# Patient Record
Sex: MALE | Race: OTHER | HISPANIC OR LATINO | Employment: OTHER | URBAN - METROPOLITAN AREA
[De-identification: names, ages, dates, MRNs, and addresses within clinical notes are randomized per-mention and may not be internally consistent; named-entity substitution may affect disease eponyms.]

---

## 2022-03-21 ENCOUNTER — HOSPITAL ENCOUNTER (EMERGENCY)
Facility: HOSPITAL | Age: 32
Discharge: HOME | End: 2022-03-22
Attending: STUDENT IN AN ORGANIZED HEALTH CARE EDUCATION/TRAINING PROGRAM
Payer: OTHER GOVERNMENT

## 2022-03-21 DIAGNOSIS — M79.605 LEFT LEG PAIN: ICD-10-CM

## 2022-03-21 DIAGNOSIS — M76.62 TENDONITIS, ACHILLES, LEFT: ICD-10-CM

## 2022-03-21 LAB
ALBUMIN SERPL BCP-MCNC: 4.5 G/DL (ref 3.5–5.2)
ALP SERPL-CCNC: 80 U/L (ref 55–135)
ALT SERPL W/O P-5'-P-CCNC: 95 U/L (ref 10–44)
ANION GAP SERPL CALC-SCNC: 8 MMOL/L (ref 8–16)
AST SERPL-CCNC: 37 U/L (ref 10–40)
BASOPHILS # BLD AUTO: 0.04 K/UL (ref 0–0.2)
BASOPHILS NFR BLD: 0.4 % (ref 0–1.9)
BILIRUB SERPL-MCNC: 0.6 MG/DL (ref 0.1–1)
BUN SERPL-MCNC: 14 MG/DL (ref 6–20)
CALCIUM SERPL-MCNC: 9.7 MG/DL (ref 8.7–10.5)
CHLORIDE SERPL-SCNC: 106 MMOL/L (ref 95–110)
CK SERPL-CCNC: 81 U/L (ref 20–200)
CO2 SERPL-SCNC: 23 MMOL/L (ref 23–29)
CREAT SERPL-MCNC: 1.1 MG/DL (ref 0.5–1.4)
DIFFERENTIAL METHOD: NORMAL
EOSINOPHIL # BLD AUTO: 0.2 K/UL (ref 0–0.5)
EOSINOPHIL NFR BLD: 2.1 % (ref 0–8)
ERYTHROCYTE [DISTWIDTH] IN BLOOD BY AUTOMATED COUNT: 12.2 % (ref 11.5–14.5)
EST. GFR  (AFRICAN AMERICAN): >60 ML/MIN/1.73 M^2
EST. GFR  (NON AFRICAN AMERICAN): >60 ML/MIN/1.73 M^2
GLUCOSE SERPL-MCNC: 96 MG/DL (ref 70–110)
HCT VFR BLD AUTO: 49.2 % (ref 40–54)
HGB BLD-MCNC: 16.6 G/DL (ref 14–18)
IMM GRANULOCYTES # BLD AUTO: 0.02 K/UL (ref 0–0.04)
IMM GRANULOCYTES NFR BLD AUTO: 0.2 % (ref 0–0.5)
LYMPHOCYTES # BLD AUTO: 3.5 K/UL (ref 1–4.8)
LYMPHOCYTES NFR BLD: 34.3 % (ref 18–48)
MCH RBC QN AUTO: 28.7 PG (ref 27–31)
MCHC RBC AUTO-ENTMCNC: 33.7 G/DL (ref 32–36)
MCV RBC AUTO: 85 FL (ref 82–98)
MONOCYTES # BLD AUTO: 0.9 K/UL (ref 0.3–1)
MONOCYTES NFR BLD: 8.8 % (ref 4–15)
NEUTROPHILS # BLD AUTO: 5.6 K/UL (ref 1.8–7.7)
NEUTROPHILS NFR BLD: 54.2 % (ref 38–73)
NRBC BLD-RTO: 0 /100 WBC
PLATELET # BLD AUTO: 260 K/UL (ref 150–450)
PMV BLD AUTO: 9.9 FL (ref 9.2–12.9)
POTASSIUM SERPL-SCNC: 3.9 MMOL/L (ref 3.5–5.1)
PROT SERPL-MCNC: 8.3 G/DL (ref 6–8.4)
RBC # BLD AUTO: 5.78 M/UL (ref 4.6–6.2)
SODIUM SERPL-SCNC: 137 MMOL/L (ref 136–145)
WBC # BLD AUTO: 10.29 K/UL (ref 3.9–12.7)

## 2022-03-21 PROCEDURE — 99284 EMERGENCY DEPT VISIT MOD MDM: CPT | Mod: 25

## 2022-03-21 PROCEDURE — 82550 ASSAY OF CK (CPK): CPT | Performed by: EMERGENCY MEDICINE

## 2022-03-21 PROCEDURE — 80053 COMPREHEN METABOLIC PANEL: CPT | Performed by: EMERGENCY MEDICINE

## 2022-03-21 PROCEDURE — 85025 COMPLETE CBC W/AUTO DIFF WBC: CPT | Performed by: EMERGENCY MEDICINE

## 2022-03-21 RX ORDER — LISINOPRIL 10 MG/1
10 TABLET ORAL
COMMUNITY
Start: 2021-12-16 | End: 2023-03-15

## 2022-03-21 RX ORDER — SERTRALINE HYDROCHLORIDE 100 MG/1
TABLET, FILM COATED ORAL
COMMUNITY
Start: 2021-08-25

## 2022-03-22 VITALS
OXYGEN SATURATION: 100 % | BODY MASS INDEX: 36.37 KG/M2 | SYSTOLIC BLOOD PRESSURE: 132 MMHG | DIASTOLIC BLOOD PRESSURE: 80 MMHG | HEIGHT: 68 IN | HEART RATE: 89 BPM | TEMPERATURE: 99 F | WEIGHT: 240 LBS | RESPIRATION RATE: 18 BRPM

## 2022-03-22 RX ORDER — HYDROCODONE BITARTRATE AND ACETAMINOPHEN 5; 325 MG/1; MG/1
1 TABLET ORAL EVERY 6 HOURS PRN
Qty: 12 TABLET | Refills: 0 | Status: SHIPPED | OUTPATIENT
Start: 2022-03-22 | End: 2022-03-25

## 2022-03-22 NOTE — ED PROVIDER NOTES
"Encounter Date: 3/21/2022    SCRIBE #1 NOTE: I, Nila Morton, am scribing for, and in the presence of,  Ruma Whitehead DO. I have scribed the following portions of the note - Other sections scribed: HPI, ROS, PE.       History     Chief Complaint   Patient presents with    Leg Pain     Pt post-op of achilles surgery in January, pt started having pain that starts in the left foot, up to the calf, and back of the hamstring. Pt took 3 doses of his flexeril with no relief. Ambulatory, VSS, Aox4     Johann Ramires is a 31 y.o male with a PMHx of HTN presenting to the ED with a chief complaint of foot pain onset 2:00PM this afternoon. The patient complains of chronic bilateral lower leg and foot pain, left more severe than right, that became severe this afternoon leading the patient to call the nurse on call line where he was told to come to the ED. Patient states that the pain is sharp and shooting, and that there is intermittent "compression pain" that shoots up from his foot and into his calf. Additionally states that last week his left foot was "double the size of the right" but that the swelling has since gone down. Reports recent left foot surgery performed by Dr. Bradley in Podiatry for plantar fascia and achilles tears on 1/14/22. Patient states that following the surgery he was prescribed Flexeril and took 3 before arrival. Reports no alleviation in pain from the Flexeril but does state that in the past the Flexeril was able to alleviate the majority of his pain. Is additionally compliant with Lisinopril. Patient is a cigar smoker. Denies numbness, weakness, chest pain, shortness of breath, nausea and vomiting. No other associated symptoms.     The history is provided by the patient. No  was used.     Review of patient's allergies indicates:  No Known Allergies  Past Medical History:   Diagnosis Date    Diabetes mellitus     Hypertension      No past surgical history on file.  No " family history on file.     Review of Systems   Constitutional: Negative for chills and fever.   HENT: Negative for congestion and sore throat.    Eyes: Negative for visual disturbance.   Respiratory: Negative for cough and shortness of breath.    Cardiovascular: Positive for leg swelling (left, resolved). Negative for chest pain.   Gastrointestinal: Negative for abdominal pain, nausea and vomiting.   Genitourinary: Negative for dysuria.   Musculoskeletal: Positive for arthralgias (bilateral lower leg and foot pain).   Skin: Negative for rash.   Neurological: Negative for weakness, numbness and headaches.   Psychiatric/Behavioral: Negative for confusion.       Physical Exam     Initial Vitals [03/21/22 2027]   BP Pulse Resp Temp SpO2   136/88 101 18 98.9 °F (37.2 °C) 96 %      MAP       --         Physical Exam    Nursing note and vitals reviewed.  Constitutional: He appears well-developed and well-nourished. He is not diaphoretic.  Non-toxic appearance. He does not have a sickly appearance. He does not appear ill.   HENT:   Head: Normocephalic and atraumatic.   Nose: Nose normal.   Mouth/Throat: No oropharyngeal exudate.   Eyes: EOM are normal. Pupils are equal, round, and reactive to light.   Neck: No tracheal deviation present. No JVD present.   Cardiovascular: Normal rate, regular rhythm, normal heart sounds and intact distal pulses.   No murmur heard.  Pulmonary/Chest: Breath sounds normal. No respiratory distress. He has no wheezes. He has no rhonchi. He has no rales.   Abdominal: Abdomen is soft. He exhibits no distension. There is no abdominal tenderness. There is no rebound and no guarding.   Musculoskeletal:         General: No tenderness or edema. Normal range of motion.      Right lower leg: Normal. No swelling, deformity, lacerations, tenderness or bony tenderness. No edema.      Left lower leg: No swelling, deformity, lacerations or bony tenderness. No edema.      Comments: Normal dorsiflexion and  plantar flexion in the LLE     Neurological: He is alert and oriented to person, place, and time. He has normal strength. He displays no atrophy and no tremor. No cranial nerve deficit. He exhibits normal muscle tone. He displays no seizure activity.   Skin: Skin is warm and dry. Capillary refill takes less than 2 seconds. No rash noted. No erythema.   Well healed scar to the posterior aspect of the distal left leg.          ED Course   Procedures  Labs Reviewed   COMPREHENSIVE METABOLIC PANEL - Abnormal; Notable for the following components:       Result Value    ALT 95 (*)     All other components within normal limits   CBC W/ AUTO DIFFERENTIAL   CK          Imaging Results          US Lower Extremity Veins Left (Final result)  Result time 03/22/22 00:13:18   Procedure changed from US Lower Extremity Veins Left     Final result by Manjinder Cisneros MD (03/22/22 00:13:18)                 Impression:      No evidence of deep venous thrombosis in the left lower extremity.      Electronically signed by: Manjinder Cisneros  Date:    03/22/2022  Time:    00:13             Narrative:    EXAMINATION:  US LOWER EXTREMITY VEINS LEFT    CLINICAL HISTORY:  achilles pain, left; previous swelling that has since resolved; Achilles tendinitis, left leg    TECHNIQUE:  Duplex and color flow Doppler evaluation and graded compression of the left lower extremity veins was performed.    COMPARISON:  None    FINDINGS:  Left thigh veins: The common femoral, femoral, popliteal, upper greater saphenous, and deep femoral veins are patent and free of thrombus. The veins are normally compressible and have normal phasic flow and augmentation response.    Left calf veins: The visualized calf veins are patent.    Contralateral CFV: The contralateral (right) common femoral vein is patent and free of thrombus.    Miscellaneous: None                                 Medications - No data to display  Medical Decision Making:   History:   Old Medical  Records: I decided to obtain old medical records.  Clinical Tests:   Lab Tests: Ordered and Reviewed  Radiological Study: Ordered and Reviewed  ED Management:   [facility]  This is an emergent evaluation of a 31 y.o male with a PMHx of HTN presenting to the ED with a chief complaint of foot pain onset 2:00PM this afternoon.  Initial vitals in the emergency department unremarkable. Physical exam noted above. DDx includes but is not limited to musculoskeletal pain, chronic pain, DVT, rhabdomyolysis.  Also considered however clinically less likely be compartment syndrome, acute fracture dislocation.  Labs and imaging including CBC, CMP, CPK, ultrasound of the left lower extremity were obtained.  Labs reveal a elevated ALT with remainder of labs unremarkable.  Left lower extremity ultrasound negative for DVT.  Given history of presentation in the setting of a benign workup, will discharge with pain medication, podiatry follow-up in ED return precautions. Patient is aware of plan and is amenable.     Ruma Whitehead D.O  EMERGENCY MEDICINE  12:36 AM 03/22/2022          Scribe Attestation:   Scribe #1: I performed the above scribed service and the documentation accurately describes the services I performed. I attest to the accuracy of the note.                 Clinical Impression:   Final diagnoses:  [M79.605] Left leg pain        I, Ruma Whitehead, DO, personally performed the services described in this documentation. All medical record entries made by the scribe were at my direction and in my presence. I have reviewed the chart and agree that the record reflects my personal performance and is accurate and complete.       ED Disposition Condition    Discharge Stable        ED Prescriptions     Medication Sig Dispense Start Date End Date Auth. Provider    HYDROcodone-acetaminophen (NORCO) 5-325 mg per tablet Take 1 tablet by mouth every 6 (six) hours as needed for Pain. 12 tablet 3/22/2022 3/25/2022 Ruma BENDER  Ventura, DO        Follow-up Information     Follow up With Specialties Details Why Contact Info    Your podiatrist  Schedule an appointment as soon as possible for a visit in 2 days Emergency Room Follow-up [facility] Emergency Medicine Go to  If symptoms worsen [address] [place] [id number] [phone]           Ruma Whitehead,   03/22/22 8888

## 2022-08-05 ENCOUNTER — TELEPHONE (OUTPATIENT)
Dept: NEUROLOGY | Facility: CLINIC | Age: 32
End: 2022-08-05
Payer: OTHER GOVERNMENT

## 2022-08-05 NOTE — TELEPHONE ENCOUNTER
----- Message from Makayla Ellis sent at 8/5/2022 10:21 AM CDT -----  Contact: [phone]  the patient is calling to get scheduled for a appt.  Pt access tried but no appts are available.  the patient can be reached at. [phone]

## 2022-08-05 NOTE — TELEPHONE ENCOUNTER
----- Message from Makayla Ellis sent at 8/5/2022 10:21 AM CDT -----  Contact: [phone]  the patient is calling to get scheduled for a appt.  Pt access tried but no appts are available.  the patient can be reached at. [phone]

## 2022-08-05 NOTE — TELEPHONE ENCOUNTER
Called number listed with message.  Someone had answered and hung up.   Called back again and no answer and left a message.

## 2022-08-05 NOTE — TELEPHONE ENCOUNTER
Spoke with pt regarding Neuro needs. Being referred for possible neuropathy. He has been seen previously by Podiatry at [place] (Dr. Amairani Bradley) and [facility] on the [facility]. Kirill of plantar fascitis with surgery x2 of plantar fasciotomy and gastrocnemius lengthening. Initial foot injury occurred in 2017 while in the . [place] cortis[place]    Has tried gabapentin and zanaflex with some limited success. He is being managed at [facility] with precocet 10mg BID. No hs of having an EMG completed.    Appt offered/accepted for 8/30/22 @ 1:00pm with Dr. Vera. Appt letter placed in the mail.

## 2022-08-30 ENCOUNTER — OFFICE VISIT (OUTPATIENT)
Dept: NEUROLOGY | Facility: CLINIC | Age: 32
End: 2022-08-30
Payer: OTHER GOVERNMENT

## 2022-08-30 VITALS
HEIGHT: 68 IN | WEIGHT: 251.44 LBS | SYSTOLIC BLOOD PRESSURE: 146 MMHG | DIASTOLIC BLOOD PRESSURE: 99 MMHG | BODY MASS INDEX: 38.11 KG/M2 | HEART RATE: 106 BPM

## 2022-08-30 DIAGNOSIS — E53.1 VITAMIN B6 DEFICIENCY: ICD-10-CM

## 2022-08-30 DIAGNOSIS — G62.9 PERIPHERAL POLYNEUROPATHY: Primary | ICD-10-CM

## 2022-08-30 DIAGNOSIS — E03.9 HYPOTHYROIDISM, UNSPECIFIED TYPE: ICD-10-CM

## 2022-08-30 DIAGNOSIS — E53.8 B12 DEFICIENCY: ICD-10-CM

## 2022-08-30 PROCEDURE — 99204 OFFICE O/P NEW MOD 45 MIN: CPT | Mod: S$PBB,,, | Performed by: STUDENT IN AN ORGANIZED HEALTH CARE EDUCATION/TRAINING PROGRAM

## 2022-08-30 PROCEDURE — 99999 PR PBB SHADOW E&M-EST. PATIENT-LVL III: CPT | Mod: PBBFAC,,, | Performed by: STUDENT IN AN ORGANIZED HEALTH CARE EDUCATION/TRAINING PROGRAM

## 2022-08-30 PROCEDURE — 99213 OFFICE O/P EST LOW 20 MIN: CPT | Mod: PBBFAC | Performed by: STUDENT IN AN ORGANIZED HEALTH CARE EDUCATION/TRAINING PROGRAM

## 2022-08-30 PROCEDURE — 99999 PR PBB SHADOW E&M-EST. PATIENT-LVL III: ICD-10-PCS | Mod: PBBFAC,,, | Performed by: STUDENT IN AN ORGANIZED HEALTH CARE EDUCATION/TRAINING PROGRAM

## 2022-08-30 PROCEDURE — 99204 PR OFFICE/OUTPT VISIT, NEW, LEVL IV, 45-59 MIN: ICD-10-PCS | Mod: S$PBB,,, | Performed by: STUDENT IN AN ORGANIZED HEALTH CARE EDUCATION/TRAINING PROGRAM

## 2022-08-30 RX ORDER — BUSPIRONE HYDROCHLORIDE 10 MG/1
20 TABLET ORAL
COMMUNITY
Start: 2022-05-20

## 2022-08-30 RX ORDER — CYCLOBENZAPRINE HCL 10 MG
10 TABLET ORAL 3 TIMES DAILY
COMMUNITY
Start: 2022-08-11

## 2022-08-30 RX ORDER — NALOXONE HYDROCHLORIDE 4 MG/.1ML
SPRAY NASAL
COMMUNITY
Start: 2022-04-27

## 2022-08-30 RX ORDER — SILDENAFIL 100 MG/1
TABLET, FILM COATED ORAL
COMMUNITY
Start: 2022-08-15

## 2022-08-30 RX ORDER — OXYCODONE AND ACETAMINOPHEN 10; 325 MG/1; MG/1
1 TABLET ORAL 2 TIMES DAILY PRN
COMMUNITY
Start: 2022-08-11

## 2022-08-30 RX ORDER — TESTOSTERONE CYPIONATE 200 MG/ML
200 INJECTION, SOLUTION INTRAMUSCULAR
COMMUNITY
Start: 2022-03-28

## 2022-08-30 RX ORDER — PROMETHAZINE HYDROCHLORIDE 25 MG/1
TABLET ORAL
COMMUNITY
Start: 2022-01-14

## 2022-08-30 RX ORDER — TRAZODONE HYDROCHLORIDE 50 MG/1
TABLET ORAL
COMMUNITY
Start: 2022-03-04

## 2022-08-30 NOTE — PROGRESS NOTES
[facility] - NEUROLOGY 7TH FL [facility], [place]    Date: 8/30/22  Patient Name: Johann Ramires   MRN: [id number]   PCP: Primary Doctor No  Referring Provider: Amairani Angel DPM    Assessment:   Johann Ramires is a 32 y.o. male presenting to clinic with complaints of b/l LE pain. See HPI for full detail. The patients described pain is consistent with a peropheral neuropathy in his distal LE b/l but additionally concerning for a possible vascular etiology as well given his prior surgeries bilaterally and occasional edema he noted in his legs. Given his additional GI history, the patient would benefit from furtehr workup of a peripheral neuropathy etiology with treatment as needed as well as possible workup with vascular. Could consider Emg/Ncs in future    Plan:     Serum labs ordered, will review and treat as indicated  Referral to vascular surgery  Continue treatment of pain symptoms by pain medicine  Could consider EMG/Ncs in future  Will follow up in 3 months  Patient to contact with any quiestions or concerns  He voiced understanding of and agreement with this plan as outlined above    Problem List Items Addressed This Visit    None  Visit Diagnoses       Peripheral polyneuropathy    -  Primary    Relevant Orders    EMG W/ ULTRASOUND AND NERVE CONDUCTION TEST 2 Extremities    TSH    T4, FREE    VITAMIN B1    VITAMIN B12    METHYLMALONIC ACID, SERUM    VITAMIN B6    HEAVY METALS SCREEN, BLOOD (QUANTITATIVE)    Ambulatory referral/consult to Vascular Surgery    B12 deficiency        Relevant Orders    VITAMIN B12    METHYLMALONIC ACID, SERUM    Vitamin B6 deficiency        Relevant Orders    VITAMIN B6    Hypothyroidism, unspecified type        Relevant Orders    TSH    T4, FREE            Armando Vera MD    Patient note was created using MModal Dictation.  Any errors in syntax or even information may not have been identified and edited on initial review prior to  "signing this note.  Subjective:   Patient seen in consultation at the request of Amairani Angel DPM for the evaluation of neuropathy. A copy of this note will be sent to the referring physician.        HPI:   Mr. Johann Ramires is a 32 y.o. male with depression, anxiety, reported H pylori history, reported low testosterone, and chronic hyperactive bowel with blood per rectum (intermittent since 2014. Patient states he has both BRBPR and dark stool). Patient has seen GI with colonoscopy completed with noted h pylori at that time and polyps with removal of polyps and treatment of H pylori. Patient has also had a history of bilateral plantar injuries of both feet in 2017 with another achilles injury in 2019. Patient had initial foot surgery in 2020 in [place] for both issues. Patient had another surgery in 01/2022 for the left foot.      Patient presented to clinic with complaints bilateral leg pain. He states that his leg pain started in 2017 following his initial injury. Patient went to ED in March 2022 due to significantly worsened pain in bilateral feet. He states that he had felt similar pain in the past but notes that he had no home medications in 03/22 for treatment - prompting him to go to the ED. Patient states that he will typically take 30mg Flexeril and 20mg Oxycodone for relief of his pain. Patient states this pain feels sharp in his achilles and plantar aspect of his feet as well as a "crushing pain" that he will feel in his calves. He notes that this pain has been the same, and bothersome to him, since 2019. Patient endorses 10/10 pain at worst with improvement of his pain to 4/10 with use of the aforementioned medications. Patient states that his sharp pain does not radiate but notes that sometimes have the crushing pain move into his hamstrings and lower back on occasion.    Patient has noted some weakness in his legs while lifting things and walking. He notes that he will feel his legs shaking. He " has not had any falls but will feel off balance.     Of note, the patient notes that he has occasional leg swelling bilaterally with skin discoloration since completion of his surgeries. He notes that they swell up appx 1-2 times per months. This swelling will occur in his medial foot and ankles.     Patient notes that he typically goes to [facility] for management of these symptoms. He is still seeing them for pain control/prescriptions.   He also has a podiatrist and goes to physical therapy.      PAST MEDICAL HISTORY:  Past Medical History:   Diagnosis Date    Diabetes mellitus     Hypertension        PAST SURGICAL HISTORY:  No past surgical history on file.    CURRENT MEDS:  Current Outpatient Medications   Medication Sig Dispense Refill    busPIRone (BUSPAR) 10 MG tablet Take 20 mg by mouth.      cyclobenzaprine (FLEXERIL) 10 MG tablet Take 10 mg by mouth 3 (three) times daily.      DEPO-TESTOSTERONE 200 mg/mL injection Inject 200 mg into the muscle every 7 days.      lisinopriL 10 MG tablet Take 10 mg by mouth.      NARCAN 4 mg/actuation Spry SMARTSIG:Spray(s) Both Nares      oxyCODONE-acetaminophen (PERCOCET)  mg per tablet Take 1 tablet by mouth 2 (two) times daily as needed.      promethazine (PHENERGAN) 25 MG tablet       sertraline (ZOLOFT) 100 MG tablet sertraline 100 mg oral tablet  Start Date: 8/25/21  Status: Ordered      sildenafiL (VIAGRA) 100 MG tablet Take by mouth.      traZODone (DESYREL) 50 MG tablet Take by mouth.       No current facility-administered medications for this visit.       ALLERGIES:  Review of patient's allergies indicates:  No Known Allergies    FAMILY HISTORY:  No family history on file.    SOCIAL HISTORY:       Review of Systems:  12 system review of systems is negative except for the symptoms mentioned in HPI.      Objective:     Vitals:    08/30/22 1335   BP: (!) 146/99   BP Location: Right arm   Patient Position: Sitting   BP Method: Large (Automatic)   Pulse: 106  "  Weight: 114 kg (251 lb 7 oz)   Height: 5' 8" (1.727 m)     General: NAD, well nourished   Eyes: no tearing, discharge, no erythema   ENT: moist mucous membranes of the oral cavity, nares patent    Neck: Supple, full range of motion  Cardiovascular: Warm and well perfused, pulses equal and symmetrical  Lungs: Normal work of breathing, normal chest wall excursions  Skin: No rash, lesions, or breakdown on exposed skin  Psychiatry: Mood and affect are appropriate   Abdomen: soft, non tender, non distended. BRBPR, blood in stool (chronic)  Extremeties: No cyanosis, clubbing or edema.  +skin discoloration on medial ankles  +peripheral neuropathy and muscular pain    Neurological   MENTAL STATUS: Alert and oriented to person, place, and time. Attention and concentration within normal limits. Speech without dysarthria, able to name and repeat without difficulty. Recent and remote memory within normal limits   CRANIAL NERVES: Visual fields intact. PERRL. EOMI. Facial sensation intact. Face symmetrical. Hearing grossly intact. Full shoulder shrug bilaterally. Tongue protrudes midline   SENSORY: Sensation is intact to light touch, vibration, and temperature throughout.  Joint position perception intact. Negative Romberg.   MOTOR: Normal bulk and tone. No pronator drift.  5/5 deltoid, biceps, triceps, interosseous, hand  bilaterally. 5/5 iliopsoas, knee extension/flexion, foot dorsi/plantarflexion bilaterally.    REFLEXES: Symmetric and 2+ throughout. Toes down going bilaterally. No Mathews. No Clonus/  CEREBELLAR/COORDINATION/GAIT: Gait steady with normal arm swing and stride length.  Heel to shin intact. Finger to nose intact. Normal rapid alternating movements.    +skin discoloration on medial ankles  "

## 2022-09-01 NOTE — PROGRESS NOTES
I have seen the patient, reviewed the Resident's history and physical, assessment, and plan. I have personally interviewed and examined the patient at bedside and agree with the findings.         Deven Slater MD  Neurology  [facility] - Neurology 7th Fl